# Patient Record
Sex: MALE | Race: WHITE | URBAN - METROPOLITAN AREA
[De-identification: names, ages, dates, MRNs, and addresses within clinical notes are randomized per-mention and may not be internally consistent; named-entity substitution may affect disease eponyms.]

---

## 2019-04-04 ENCOUNTER — NEW REFERRAL (OUTPATIENT)
Dept: URBAN - METROPOLITAN AREA CLINIC 26 | Facility: CLINIC | Age: 84
End: 2019-04-04

## 2019-04-04 VITALS
BODY MASS INDEX: 27.09 KG/M2 | WEIGHT: 200 LBS | DIASTOLIC BLOOD PRESSURE: 76 MMHG | HEIGHT: 72 IN | HEART RATE: 80 BPM | SYSTOLIC BLOOD PRESSURE: 128 MMHG

## 2019-04-04 DIAGNOSIS — H43.812: ICD-10-CM

## 2019-04-04 DIAGNOSIS — H35.3211: ICD-10-CM

## 2019-04-04 DIAGNOSIS — H35.54: ICD-10-CM

## 2019-04-04 DIAGNOSIS — H35.3122: ICD-10-CM

## 2019-04-04 PROCEDURE — 92250 FUNDUS PHOTOGRAPHY W/I&R: CPT

## 2019-04-04 PROCEDURE — 92134 CPTRZ OPH DX IMG PST SGM RTA: CPT

## 2019-04-04 PROCEDURE — 92235 FLUORESCEIN ANGRPH MLTIFRAME: CPT

## 2019-04-04 PROCEDURE — 67028 INJECTION EYE DRUG: CPT

## 2019-04-04 PROCEDURE — 99204 OFFICE O/P NEW MOD 45 MIN: CPT

## 2019-04-04 ASSESSMENT — VISUAL ACUITY
OS_SC: 20/100+2
OD_SC: 20/100+1
OS_PH: 20/80+2

## 2019-04-04 ASSESSMENT — TONOMETRY
OS_IOP_MMHG: 10
OD_IOP_MMHG: 10

## 2019-05-09 ENCOUNTER — CLINICAL PROCEDURE AND DIAGNOSTIC TESTING ONLY (OUTPATIENT)
Dept: URBAN - METROPOLITAN AREA CLINIC 26 | Facility: CLINIC | Age: 84
End: 2019-05-09

## 2019-05-09 DIAGNOSIS — H35.3122: ICD-10-CM

## 2019-05-09 DIAGNOSIS — H35.3211: ICD-10-CM

## 2019-05-09 PROCEDURE — 92250 FUNDUS PHOTOGRAPHY W/I&R: CPT

## 2019-05-09 PROCEDURE — 67028 INJECTION EYE DRUG: CPT

## 2019-05-09 ASSESSMENT — VISUAL ACUITY: OD_SC: 20/100+2

## 2019-05-09 ASSESSMENT — TONOMETRY: OD_IOP_MMHG: 12

## 2019-05-24 ENCOUNTER — IMPORTED ENCOUNTER (OUTPATIENT)
Dept: URBAN - METROPOLITAN AREA CLINIC 31 | Facility: CLINIC | Age: 84
End: 2019-05-24

## 2019-05-24 PROBLEM — H02.115: Noted: 2019-05-24

## 2019-05-24 PROBLEM — H02.112: Noted: 2019-05-24

## 2019-05-24 PROBLEM — H35.3211: Noted: 2019-05-24

## 2019-05-24 PROBLEM — H02.035: Noted: 2019-05-24

## 2019-05-24 PROBLEM — H25.813: Noted: 2019-05-24

## 2019-05-24 PROBLEM — H02.032: Noted: 2019-05-24

## 2019-05-24 PROBLEM — H35.3122: Noted: 2019-05-24

## 2019-05-24 PROBLEM — H02.034: Noted: 2019-05-24

## 2019-05-24 PROCEDURE — 92004 COMPRE OPH EXAM NEW PT 1/>: CPT

## 2019-06-10 ENCOUNTER — CLINICAL PROCEDURE AND DIAGNOSTIC TESTING ONLY (OUTPATIENT)
Dept: URBAN - METROPOLITAN AREA CLINIC 26 | Facility: CLINIC | Age: 84
End: 2019-06-10

## 2019-06-10 DIAGNOSIS — H35.3122: ICD-10-CM

## 2019-06-10 DIAGNOSIS — H35.3211: ICD-10-CM

## 2019-06-10 PROCEDURE — 92250 FUNDUS PHOTOGRAPHY W/I&R: CPT

## 2019-06-10 PROCEDURE — 67028 INJECTION EYE DRUG: CPT

## 2019-06-10 ASSESSMENT — VISUAL ACUITY: OD_SC: 20/80

## 2019-06-10 ASSESSMENT — TONOMETRY: OD_IOP_MMHG: 11

## 2019-07-15 ENCOUNTER — CLINICAL PROCEDURE AND DIAGNOSTIC TESTING ONLY (OUTPATIENT)
Dept: URBAN - METROPOLITAN AREA CLINIC 26 | Facility: CLINIC | Age: 84
End: 2019-07-15

## 2019-07-15 DIAGNOSIS — H35.3122: ICD-10-CM

## 2019-07-15 DIAGNOSIS — H35.3211: ICD-10-CM

## 2019-07-15 PROCEDURE — 67028 INJECTION EYE DRUG: CPT

## 2019-07-15 PROCEDURE — 92134 CPTRZ OPH DX IMG PST SGM RTA: CPT

## 2019-07-15 ASSESSMENT — VISUAL ACUITY: OD_SC: 20/80

## 2019-07-15 ASSESSMENT — TONOMETRY: OD_IOP_MMHG: 11

## 2019-07-26 ENCOUNTER — APPOINTMENT (RX ONLY)
Dept: URBAN - METROPOLITAN AREA CLINIC 121 | Facility: CLINIC | Age: 84
Setting detail: DERMATOLOGY
End: 2019-07-26

## 2019-07-26 DIAGNOSIS — D18.0 HEMANGIOMA: ICD-10-CM

## 2019-07-26 DIAGNOSIS — L57.0 ACTINIC KERATOSIS: ICD-10-CM

## 2019-07-26 DIAGNOSIS — L81.4 OTHER MELANIN HYPERPIGMENTATION: ICD-10-CM

## 2019-07-26 DIAGNOSIS — L82.1 OTHER SEBORRHEIC KERATOSIS: ICD-10-CM

## 2019-07-26 PROBLEM — D18.01 HEMANGIOMA OF SKIN AND SUBCUTANEOUS TISSUE: Status: ACTIVE | Noted: 2019-07-26

## 2019-07-26 PROBLEM — Z85.118 PERSONAL HISTORY OF OTHER MALIGNANT NEOPLASM OF BRONCHUS AND LUNG: Status: ACTIVE | Noted: 2019-07-26

## 2019-07-26 PROBLEM — H91.90 UNSPECIFIED HEARING LOSS, UNSPECIFIED EAR: Status: ACTIVE | Noted: 2019-07-26

## 2019-07-26 PROCEDURE — 17000 DESTRUCT PREMALG LESION: CPT

## 2019-07-26 PROCEDURE — 17003 DESTRUCT PREMALG LES 2-14: CPT

## 2019-07-26 PROCEDURE — 99203 OFFICE O/P NEW LOW 30 MIN: CPT | Mod: 25

## 2019-07-26 PROCEDURE — ? LIQUID NITROGEN

## 2019-07-26 ASSESSMENT — LOCATION ZONE DERM
LOCATION ZONE: LEG
LOCATION ZONE: EAR
LOCATION ZONE: TRUNK
LOCATION ZONE: ARM

## 2019-07-26 ASSESSMENT — LOCATION SIMPLE DESCRIPTION DERM
LOCATION SIMPLE: LEFT PRETIBIAL REGION
LOCATION SIMPLE: CHEST
LOCATION SIMPLE: UPPER BACK
LOCATION SIMPLE: LEFT EAR
LOCATION SIMPLE: RIGHT PRETIBIAL REGION
LOCATION SIMPLE: LEFT FOREARM
LOCATION SIMPLE: LEFT ELBOW
LOCATION SIMPLE: ABDOMEN

## 2019-07-26 ASSESSMENT — LOCATION DETAILED DESCRIPTION DERM
LOCATION DETAILED: PERIUMBILICAL SKIN
LOCATION DETAILED: STERNUM
LOCATION DETAILED: LEFT ELBOW
LOCATION DETAILED: RIGHT PROXIMAL PRETIBIAL REGION
LOCATION DETAILED: SUPERIOR THORACIC SPINE
LOCATION DETAILED: LEFT PROXIMAL PRETIBIAL REGION
LOCATION DETAILED: LEFT PROXIMAL DORSAL FOREARM
LOCATION DETAILED: LEFT DISTAL DORSAL FOREARM
LOCATION DETAILED: LEFT INFERIOR POSTERIOR HELIX

## 2019-07-26 NOTE — PROCEDURE: LIQUID NITROGEN
Post-Care Instructions: I reviewed with the patient in detail post-care instructions. Patient is to wear sunprotection, and avoid picking at any of the treated lesions. Pt may apply Vaseline to crusted or scabbing areas.
Render Note In Bullet Format When Appropriate: No
Detail Level: Simple
Number Of Freeze-Thaw Cycles: 2 freeze-thaw cycles
Consent: The patient's consent was obtained including but not limited to risks of crusting, scabbing, blistering, scarring, darker or lighter pigmentary change, recurrence, incomplete removal and infection.
Duration Of Freeze Thaw-Cycle (Seconds): 10

## 2019-07-26 NOTE — PROCEDURE: MIPS QUALITY
Quality 131: Pain Assessment And Follow-Up: Pain assessment using a standardized tool is documented as negative, no follow-up plan required
Detail Level: Detailed
Quality 130: Documentation Of Current Medications In The Medical Record: Eligible clinician attests to documenting in the medical record the patient is not eligible for a current list of medications being obtained, updated, or reviewed by the eligible clinician
Quality 474: Zoster Vaccination Status: Shingrix vaccine was not administered for reasons documented by clinician (e.g. patient administered vaccine other than Shingrix, patient allergy or other medical reasons, patient declined or other patient reasons, vaccine not available or other system reasons)
Additional Notes: 0/10 for pain \\nPatient declines Shingrix

## 2019-08-06 ENCOUNTER — IMPORTED ENCOUNTER (OUTPATIENT)
Dept: URBAN - METROPOLITAN AREA CLINIC 31 | Facility: CLINIC | Age: 84
End: 2019-08-06

## 2019-08-06 PROBLEM — H25.13: Noted: 2019-08-06

## 2019-08-06 PROCEDURE — 76519 ECHO EXAM OF EYE: CPT

## 2019-08-13 ENCOUNTER — IMPORTED ENCOUNTER (OUTPATIENT)
Dept: URBAN - METROPOLITAN AREA CLINIC 31 | Facility: CLINIC | Age: 84
End: 2019-08-13

## 2019-08-13 PROBLEM — Z96.1: Noted: 2019-08-13

## 2019-08-13 PROCEDURE — 99024 POSTOP FOLLOW-UP VISIT: CPT

## 2019-08-20 ENCOUNTER — IMPORTED ENCOUNTER (OUTPATIENT)
Dept: URBAN - METROPOLITAN AREA CLINIC 31 | Facility: CLINIC | Age: 84
End: 2019-08-20

## 2019-08-20 PROBLEM — Z96.1: Noted: 2019-08-20

## 2019-08-20 PROCEDURE — 99024 POSTOP FOLLOW-UP VISIT: CPT

## 2019-08-22 ENCOUNTER — IMPORTED ENCOUNTER (OUTPATIENT)
Dept: URBAN - METROPOLITAN AREA CLINIC 31 | Facility: CLINIC | Age: 84
End: 2019-08-22

## 2019-08-22 PROBLEM — Z96.1: Noted: 2019-08-22

## 2019-08-22 PROCEDURE — 99024 POSTOP FOLLOW-UP VISIT: CPT

## 2019-08-23 ENCOUNTER — IMPORTED ENCOUNTER (OUTPATIENT)
Dept: URBAN - METROPOLITAN AREA CLINIC 31 | Facility: CLINIC | Age: 84
End: 2019-08-23

## 2019-08-23 PROBLEM — Z96.1: Noted: 2019-08-23

## 2019-08-23 PROBLEM — S05.02XA: Noted: 2019-08-23

## 2019-08-23 PROCEDURE — 99024 POSTOP FOLLOW-UP VISIT: CPT

## 2019-08-27 ENCOUNTER — IMPORTED ENCOUNTER (OUTPATIENT)
Dept: URBAN - METROPOLITAN AREA CLINIC 31 | Facility: CLINIC | Age: 84
End: 2019-08-27

## 2019-08-27 PROBLEM — Z96.1: Noted: 2019-08-27

## 2019-08-27 PROBLEM — H04.123: Noted: 2019-08-27

## 2019-08-27 PROCEDURE — 99024 POSTOP FOLLOW-UP VISIT: CPT

## 2019-09-04 ENCOUNTER — FOLLOW UP AND POST INJECTION EVALUATION (OUTPATIENT)
Dept: URBAN - METROPOLITAN AREA CLINIC 26 | Facility: CLINIC | Age: 84
End: 2019-09-04

## 2019-09-04 DIAGNOSIS — H35.3122: ICD-10-CM

## 2019-09-04 DIAGNOSIS — H35.3211: ICD-10-CM

## 2019-09-04 DIAGNOSIS — H35.54: ICD-10-CM

## 2019-09-04 DIAGNOSIS — H43.812: ICD-10-CM

## 2019-09-04 PROCEDURE — 92134 CPTRZ OPH DX IMG PST SGM RTA: CPT

## 2019-09-04 PROCEDURE — 92250 FUNDUS PHOTOGRAPHY W/I&R: CPT

## 2019-09-04 PROCEDURE — 92014 COMPRE OPH EXAM EST PT 1/>: CPT

## 2019-09-04 PROCEDURE — 67028 INJECTION EYE DRUG: CPT

## 2019-09-04 ASSESSMENT — TONOMETRY
OS_IOP_MMHG: 11
OD_IOP_MMHG: 12

## 2019-09-04 ASSESSMENT — VISUAL ACUITY
OS_CC: 20/40-1
OD_CC: 20/60+1

## 2019-10-09 ENCOUNTER — CLINICAL PROCEDURE AND DIAGNOSTIC TESTING ONLY (OUTPATIENT)
Dept: URBAN - METROPOLITAN AREA CLINIC 26 | Facility: CLINIC | Age: 84
End: 2019-10-09

## 2019-10-09 DIAGNOSIS — H35.3122: ICD-10-CM

## 2019-10-09 DIAGNOSIS — H35.3211: ICD-10-CM

## 2019-10-09 PROCEDURE — 67028 INJECTION EYE DRUG: CPT

## 2019-10-09 PROCEDURE — 92250 FUNDUS PHOTOGRAPHY W/I&R: CPT

## 2019-10-09 ASSESSMENT — TONOMETRY: OD_IOP_MMHG: 12

## 2019-10-09 ASSESSMENT — VISUAL ACUITY: OD_SC: 20/60+1

## 2019-11-13 ENCOUNTER — CLINICAL PROCEDURE AND DIAGNOSTIC TESTING ONLY (OUTPATIENT)
Dept: URBAN - METROPOLITAN AREA CLINIC 26 | Facility: CLINIC | Age: 84
End: 2019-11-13

## 2019-11-13 DIAGNOSIS — H35.3211: ICD-10-CM

## 2019-11-13 DIAGNOSIS — H35.3122: ICD-10-CM

## 2019-11-13 PROCEDURE — 67028 INJECTION EYE DRUG: CPT

## 2019-11-13 PROCEDURE — 92134 CPTRZ OPH DX IMG PST SGM RTA: CPT

## 2019-11-13 ASSESSMENT — TONOMETRY: OD_IOP_MMHG: 11

## 2019-11-13 ASSESSMENT — VISUAL ACUITY: OD_SC: 20/60

## 2019-12-18 ENCOUNTER — CLINICAL PROCEDURE AND DIAGNOSTIC TESTING ONLY (OUTPATIENT)
Dept: URBAN - METROPOLITAN AREA CLINIC 26 | Facility: CLINIC | Age: 84
End: 2019-12-18

## 2019-12-18 DIAGNOSIS — H35.3211: ICD-10-CM

## 2019-12-18 DIAGNOSIS — H35.3122: ICD-10-CM

## 2019-12-18 PROCEDURE — 67028 INJECTION EYE DRUG: CPT

## 2019-12-18 PROCEDURE — 92250 FUNDUS PHOTOGRAPHY W/I&R: CPT

## 2019-12-18 ASSESSMENT — VISUAL ACUITY: OD_SC: 20/60-1

## 2019-12-18 ASSESSMENT — TONOMETRY: OD_IOP_MMHG: 13

## 2020-01-23 ENCOUNTER — FOLLOW UP AND POST INJECTION EVALUATION (OUTPATIENT)
Dept: URBAN - METROPOLITAN AREA CLINIC 26 | Facility: CLINIC | Age: 85
End: 2020-01-23

## 2020-01-23 VITALS — WEIGHT: 200 LBS | HEIGHT: 70 IN | BODY MASS INDEX: 28.63 KG/M2

## 2020-01-23 DIAGNOSIS — H43.812: ICD-10-CM

## 2020-01-23 DIAGNOSIS — H02.831: ICD-10-CM

## 2020-01-23 DIAGNOSIS — H01.02B: ICD-10-CM

## 2020-01-23 DIAGNOSIS — H35.3211: ICD-10-CM

## 2020-01-23 DIAGNOSIS — H35.3122: ICD-10-CM

## 2020-01-23 DIAGNOSIS — H02.834: ICD-10-CM

## 2020-01-23 DIAGNOSIS — H35.54: ICD-10-CM

## 2020-01-23 DIAGNOSIS — H04.123: ICD-10-CM

## 2020-01-23 PROCEDURE — 92014 COMPRE OPH EXAM EST PT 1/>: CPT

## 2020-01-23 PROCEDURE — 92134 CPTRZ OPH DX IMG PST SGM RTA: CPT

## 2020-01-23 PROCEDURE — 92250 FUNDUS PHOTOGRAPHY W/I&R: CPT

## 2020-01-23 ASSESSMENT — VISUAL ACUITY
OD_PH: 20/70+2
OS_SC: 20/40-2
OS_PH: 20/30-2
OD_SC: 20/70+1

## 2020-01-23 ASSESSMENT — TONOMETRY
OS_IOP_MMHG: 11
OD_IOP_MMHG: 10

## 2020-02-27 ENCOUNTER — IMPORTED ENCOUNTER (OUTPATIENT)
Dept: URBAN - METROPOLITAN AREA CLINIC 31 | Facility: CLINIC | Age: 85
End: 2020-02-27

## 2020-02-27 PROBLEM — Z96.1: Noted: 2020-02-27

## 2020-02-27 PROBLEM — H35.3122: Noted: 2020-02-27

## 2020-02-27 PROBLEM — H43.811: Noted: 2020-02-27

## 2020-02-27 PROBLEM — H35.3113: Noted: 2020-02-27

## 2020-02-27 PROCEDURE — 92250 FUNDUS PHOTOGRAPHY W/I&R: CPT

## 2020-02-27 PROCEDURE — 92014 COMPRE OPH EXAM EST PT 1/>: CPT

## 2020-02-27 PROCEDURE — 92015 DETERMINE REFRACTIVE STATE: CPT

## 2020-03-03 ENCOUNTER — APPOINTMENT (RX ONLY)
Dept: URBAN - METROPOLITAN AREA CLINIC 121 | Facility: CLINIC | Age: 85
Setting detail: DERMATOLOGY
End: 2020-03-03

## 2020-03-03 DIAGNOSIS — D18.0 HEMANGIOMA: ICD-10-CM

## 2020-03-03 DIAGNOSIS — L81.4 OTHER MELANIN HYPERPIGMENTATION: ICD-10-CM

## 2020-03-03 DIAGNOSIS — L82.1 OTHER SEBORRHEIC KERATOSIS: ICD-10-CM

## 2020-03-03 PROBLEM — D18.01 HEMANGIOMA OF SKIN AND SUBCUTANEOUS TISSUE: Status: ACTIVE | Noted: 2020-03-03

## 2020-03-03 PROCEDURE — 99214 OFFICE O/P EST MOD 30 MIN: CPT

## 2020-03-03 PROCEDURE — ? TREATMENT REGIMEN

## 2020-03-03 ASSESSMENT — LOCATION DETAILED DESCRIPTION DERM
LOCATION DETAILED: LEFT DISTAL DORSAL FOREARM
LOCATION DETAILED: LEFT PROXIMAL PRETIBIAL REGION
LOCATION DETAILED: SUPERIOR THORACIC SPINE
LOCATION DETAILED: RIGHT PROXIMAL DORSAL FOREARM
LOCATION DETAILED: STERNUM
LOCATION DETAILED: PERIUMBILICAL SKIN
LOCATION DETAILED: RIGHT PROXIMAL PRETIBIAL REGION
LOCATION DETAILED: LEFT ELBOW

## 2020-03-03 ASSESSMENT — LOCATION ZONE DERM
LOCATION ZONE: ARM
LOCATION ZONE: TRUNK
LOCATION ZONE: LEG

## 2020-03-03 ASSESSMENT — LOCATION SIMPLE DESCRIPTION DERM
LOCATION SIMPLE: LEFT ELBOW
LOCATION SIMPLE: ABDOMEN
LOCATION SIMPLE: RIGHT FOREARM
LOCATION SIMPLE: LEFT FOREARM
LOCATION SIMPLE: LEFT PRETIBIAL REGION
LOCATION SIMPLE: RIGHT PRETIBIAL REGION
LOCATION SIMPLE: CHEST
LOCATION SIMPLE: UPPER BACK

## 2020-06-03 ENCOUNTER — FOLLOW UP (OUTPATIENT)
Dept: URBAN - METROPOLITAN AREA CLINIC 26 | Facility: CLINIC | Age: 85
End: 2020-06-03

## 2020-06-03 VITALS — HEART RATE: 57 BPM | DIASTOLIC BLOOD PRESSURE: 85 MMHG | SYSTOLIC BLOOD PRESSURE: 158 MMHG | HEIGHT: 60 IN

## 2020-06-03 DIAGNOSIS — H35.3211: ICD-10-CM

## 2020-06-03 DIAGNOSIS — H35.3122: ICD-10-CM

## 2020-06-03 DIAGNOSIS — H35.54: ICD-10-CM

## 2020-06-03 DIAGNOSIS — H43.812: ICD-10-CM

## 2020-06-03 PROCEDURE — 92235 FLUORESCEIN ANGRPH MLTIFRAME: CPT

## 2020-06-03 PROCEDURE — 92250 FUNDUS PHOTOGRAPHY W/I&R: CPT

## 2020-06-03 PROCEDURE — 92014 COMPRE OPH EXAM EST PT 1/>: CPT

## 2020-06-03 PROCEDURE — 92134 CPTRZ OPH DX IMG PST SGM RTA: CPT

## 2020-06-03 ASSESSMENT — VISUAL ACUITY
OD_PH: 20/60+2
OD_SC: 20/100+1
OS_SC: 20/50+2
OS_PH: 20/40-2

## 2020-06-03 ASSESSMENT — TONOMETRY
OS_IOP_MMHG: 9
OD_IOP_MMHG: 10

## 2020-11-03 ENCOUNTER — FOLLOW UP (OUTPATIENT)
Dept: URBAN - METROPOLITAN AREA CLINIC 26 | Facility: CLINIC | Age: 85
End: 2020-11-03

## 2020-11-03 DIAGNOSIS — H35.54: ICD-10-CM

## 2020-11-03 DIAGNOSIS — H02.834: ICD-10-CM

## 2020-11-03 DIAGNOSIS — H35.3122: ICD-10-CM

## 2020-11-03 DIAGNOSIS — H01.02B: ICD-10-CM

## 2020-11-03 DIAGNOSIS — H02.831: ICD-10-CM

## 2020-11-03 DIAGNOSIS — H43.812: ICD-10-CM

## 2020-11-03 DIAGNOSIS — H04.123: ICD-10-CM

## 2020-11-03 DIAGNOSIS — H35.3211: ICD-10-CM

## 2020-11-03 PROCEDURE — 92134 CPTRZ OPH DX IMG PST SGM RTA: CPT

## 2020-11-03 PROCEDURE — 92250 FUNDUS PHOTOGRAPHY W/I&R: CPT

## 2020-11-03 PROCEDURE — 92014 COMPRE OPH EXAM EST PT 1/>: CPT

## 2020-11-03 ASSESSMENT — TONOMETRY
OS_IOP_MMHG: 8
OD_IOP_MMHG: 12

## 2020-11-03 ASSESSMENT — VISUAL ACUITY
OS_PH: 20/50+1
OS_SC: 20/50-2
OD_SC: 20/60+2

## 2021-05-03 ENCOUNTER — FOLLOW UP (OUTPATIENT)
Dept: URBAN - METROPOLITAN AREA CLINIC 26 | Facility: CLINIC | Age: 86
End: 2021-05-03

## 2021-05-03 DIAGNOSIS — H35.3122: ICD-10-CM

## 2021-05-03 DIAGNOSIS — H02.831: ICD-10-CM

## 2021-05-03 DIAGNOSIS — H43.812: ICD-10-CM

## 2021-05-03 DIAGNOSIS — H35.54: ICD-10-CM

## 2021-05-03 DIAGNOSIS — H01.02B: ICD-10-CM

## 2021-05-03 DIAGNOSIS — H02.834: ICD-10-CM

## 2021-05-03 DIAGNOSIS — H35.3211: ICD-10-CM

## 2021-05-03 DIAGNOSIS — H04.123: ICD-10-CM

## 2021-05-03 PROCEDURE — 92250 FUNDUS PHOTOGRAPHY W/I&R: CPT

## 2021-05-03 PROCEDURE — 92134 CPTRZ OPH DX IMG PST SGM RTA: CPT

## 2021-05-03 PROCEDURE — 92014 COMPRE OPH EXAM EST PT 1/>: CPT

## 2021-05-03 ASSESSMENT — TONOMETRY
OD_IOP_MMHG: 10
OS_IOP_MMHG: 10

## 2021-05-03 ASSESSMENT — VISUAL ACUITY
OD_PH: 20/50
OD_SC: 20/60
OS_SC: 20/50

## 2022-04-02 ASSESSMENT — VISUAL ACUITY
OD_PH: SC 20/80 +1
OS_PH: SC 20/60 -1
OS_SC: J717''
OD_CC: 20/60-2
OS_CC: J3
OD_CC: 20/80
OD_CC: 20/80-2
OD_CC: J7
OS_SC: 20/40-2
OS_CC: 20/50-2
OS_CC: 20/400
OS_CC: 20/50+1
OS_CC: 20/40
OD_CC: 20/60+2
OS_CC: 20/60-2
OS_SC: 20/70
OS_PH: SC 20/40 -3
OD_SC: 20/200
OS_GLARE: 20/60MED
OS_PH: SC 20/40
OD_CC: 20/70
OD_SC: 20/100-1
OS_CC: 20/40-2
OS_CC: 20/40
OS_CC: 20/200
OD_CC: 20/70+1
OD_CC: 20/200
OD_PH: SC 20/60 -2

## 2022-04-02 ASSESSMENT — TONOMETRY
OS_IOP_MMHG: 14
OD_IOP_MMHG: 9
OS_IOP_MMHG: 12
OS_IOP_MMHG: 10
OS_IOP_MMHG: 11
OS_IOP_MMHG: 10
OD_IOP_MMHG: 14
OD_IOP_MMHG: 10
OS_IOP_MMHG: 35
OD_IOP_MMHG: 15
OD_IOP_MMHG: 14

## 2022-04-02 ASSESSMENT — PACHYMETRY: OD_CT_UM: 586
